# Patient Record
Sex: FEMALE | Race: WHITE | Employment: OTHER | URBAN - METROPOLITAN AREA
[De-identification: names, ages, dates, MRNs, and addresses within clinical notes are randomized per-mention and may not be internally consistent; named-entity substitution may affect disease eponyms.]

---

## 2019-09-09 ENCOUNTER — HOSPITAL ENCOUNTER (OUTPATIENT)
Age: 84
Discharge: HOME | End: 2019-09-09
Attending: FAMILY MEDICINE
Payer: MEDICARE

## 2019-09-09 VITALS
OXYGEN SATURATION: 97 % | DIASTOLIC BLOOD PRESSURE: 81 MMHG | TEMPERATURE: 98 F | SYSTOLIC BLOOD PRESSURE: 127 MMHG | RESPIRATION RATE: 18 BRPM | WEIGHT: 135 LBS | HEART RATE: 80 BPM

## 2019-09-09 DIAGNOSIS — R39.15 URINARY URGENCY: Primary | ICD-10-CM

## 2019-09-09 PROCEDURE — 99202 OFFICE O/P NEW SF 15 MIN: CPT

## 2019-09-09 RX ORDER — DARIFENACIN HYDROBROMIDE 15 MG/1
15 TABLET, EXTENDED RELEASE ORAL DAILY
COMMUNITY

## 2019-09-09 RX ORDER — TIOTROPIUM BROMIDE 18 UG/1
CAPSULE ORAL; RESPIRATORY (INHALATION)
COMMUNITY
Start: 2019-09-05

## 2019-09-09 NOTE — ED INITIAL ASSESSMENT (HPI)
C/o urgency for \"a while\". Visiting from out of town. Saw a doctor \"on the road\" a couple weeks ago. Notified by phone today stating she has a UTI. RX called to a pharmacy she recently visited. No fever.

## 2019-09-09 NOTE — ED PROVIDER NOTES
Patient Seen in: [address]    History   Patient presents with:  Urinary Symptoms (urologic)    Stated Complaint: uti? HPI    Patient is here to be treated for possible UTI.   Per patient she had seen a physician in [place]. Neurological: She is alert and oriented to person, place, and time. Skin: Capillary refill takes less than 2 seconds. She is not diaphoretic. Psychiatric: She has a normal mood and affect.  Her behavior is normal.   Nursing note and vitals

## 2019-09-09 NOTE — ED NOTES
[address]  in [place] [address] regarding urine culture results done by Dr. Deepa Trevino at [phone]. Casandra (staff) relayed urine culture result growing eterococcus faecalis 10,000-25,000.  João Beaver was called to a pharmacy in [place]